# Patient Record
(demographics unavailable — no encounter records)

---

## 2025-02-28 NOTE — PHYSICAL EXAM
[Alert] : alert [Normocephalic] : normocephalic [Flat Open Anterior [place]] : flat open anterior fontanelle [PERRL] : PERRL [Red Reflex Bilateral] : red reflex bilateral [Normally Placed Ears] : normally placed ears [Auricles Well Formed] : auricles well formed [Clear Tympanic membranes] : clear tympanic membranes [Light reflex present] : light reflex present [Bony structures visible] : bony structures visible [Patent Auditory Canal] : patent auditory canal [Nares Patent] : nares patent [Palate Intact] : palate intact [Uvula Midline] : uvula midline [Supple, full passive range of motion] : supple, full passive range of motion [Symmetric Chest Rise] : symmetric chest rise [Clear to Auscultation Bilaterally] : clear to auscultation bilaterally [Regular Rate and Rhythm] : regular rate and rhythm [S1, S2 present] : S1, S2 present [+2 Femoral Pulses] : +2 femoral pulses [Soft] : soft [Bowel Sounds] : bowel sounds present [Umbilical Stump Dry, Clean, Intact] : umbilical stump dry, clean, intact [Normal external genitalia] : normal external genitalia [Patent Vagina] : patent vagina [Patent] : patent [Normally Placed] : normally placed [No Abnormal Lymph Nodes Palpated] : no abnormal lymph nodes palpated [Symmetric Flexed Extremities] : symmetric flexed extremities [Startle Reflex] : startle reflex present [Suck Reflex] : suck reflex present [Rooting] : rooting reflex present [Palmar Grasp] : palmar grasp present [Plantar Grasp] : plantar reflex present [Symmetric Maris] : symmetric [place] [Acute Distress] : no acute distress [Icteric sclera] : nonicteric sclera [Discharge] : no discharge [Palpable Masses] : no palpable masses [Murmurs] : no murmurs [Tender] : nontender [Distended] : not distended [Hepatomegaly] : no hepatomegaly [Splenomegaly] : no splenomegaly [Clitoromegaly] : no clitoromegaly [Cheng-Ortolani] : negative Cheng-Ortolani [Spinal Dimple] : no spinal dimple [Tuft of Hair] : no tuft of hair [Jaundice] : not jaundice [de-identified] : +polydactyly b/l

## 2025-02-28 NOTE — HISTORY OF PRESENT ILLNESS
[Breast milk] : breast milk [Formula ___ oz/feed] : [unfilled] oz of formula per feed [Normal] : Normal [In Bassinet/Crib] : sleeps in bassinet/crib [On back] : sleeps on back [No] : No cigarette smoke exposure [Hepatitis B Vaccine Given] : Hepatitis B vaccine given [FreeTextEntry8] : Date/Time of Birth 2025 14:02 Gestational Age at Birth (WEEKS) 38 Week(s) Admission Weight (GRAMS) 2840 Gm Admission Height (CENTIMETERS) 48 cm Discharge weight was 2593 g Weight Change Percentage: -8.7  Hospital Course Report as per L&D RN: 38.1wk female born via primary CS due to history of myomectomy on 25 at 1402 to a 37 y/o  blood type A+ mother. Maternal history of anemia. No significant prenatal history. PNL as follows: HIV -, Hep B/C - RPR NR, Rubella I, GBS - on 25. AROM at delivery fluid. Baby emerged vigorous, crying, was warmed, dried,suctioned and stimulated with APGARS of 9/9. Mom plans to initiate breastfeeding. Consents to Hep B vaccine. Highest maternal temp 37C.  Since admission to the  nursery, baby has been feeding, voiding, and stooling appropriately. Vitals remained stable during admission. Baby received routine  care. Baby was noted to be have b/l polydactyly and recommended outpatient follow up with plastic surgery. Due to increased weight loss, mom started supplementing and recommended close monitoring of wet diapers and stools.  Discharge Bilirubin 6.9 at 36 hours of life below phototherapy threshold [Hours between feeds ___] : Child is fed every [unfilled] hours [Co-sleeping] : no co-sleeping [Loose bedding, pillow, toys, and/or bumpers in crib] : no loose bedding, pillow, toys, and/or bumpers in crib [Exposure to electronic nicotine delivery system] : No exposure to electronic nicotine delivery system

## 2025-02-28 NOTE — DISCUSSION/SUMMARY
Patient just had a procedure yesterday and today she is coughing. Advised patient to follow the plan Dr. Szymanski gave her for post up procedures. Patient verbalized understanding and had no further questions.   [ Transition] :  transition [ Care] :  care [Nutritional Adequacy] : nutritional adequacy [Parental Well-Being] : parental well-being [Safety] : safety [FreeTextEntry1] : Carlotta is a 3-day-old ex-38wkr F here today for  visit. Baby has dropped 15% below birth weight. Likely due to inadequate intake as mom's supply not in and was told to limit formula intake to 20cc  NUTRITION -Discussed supplementing every feed and increasing amount as tolerated. No more than 3 hours in between feeds -Start D-vi-sol once daily  SURGERY -Follow up hand specialist for polydactyl  HEALTH MAINTENANCE -Received Hep B #1 at birth -Recommend parents to get flu/tdap vaccines T bili 6.1  ANTICIPATORY GUIDANCE - topics discussed: Nutrition, elimination, immunity, umbilical cord care, car safety  RTC tomorrow for weight check

## 2025-03-01 NOTE — HISTORY OF PRESENT ILLNESS
[de-identified] : FOLLOW UP ON WEIGHT [FreeTextEntry6] : BF and supplementing 2 oz formula every 3 hours. normal wet diapers and stools. no acute concerns

## 2025-03-01 NOTE — DISCUSSION/SUMMARY
[FreeTextEntry1] : Carlotta is a 4-day-old here today for weight check. Baby is feeding, voiding, stooling, and gaining weight well, still below birth weight. No acute concerns  NUTRITION -Continue with current feeds  ANTICIPATORY GUIDANCE -[place] topics discussed: Nutrition, elimination, immunity, car safety, tummy time, umbilical cord care  weight check at lactation visit next week

## 2025-03-26 NOTE — DISCUSSION/SUMMARY

## 2025-03-26 NOTE — DEVELOPMENTAL MILESTONES
[Passed] : passed [Calms when picked up or spoken to] : calms when picked up or spoken to [Looks briefly at objects] : looks briefly at objects [Alerts to unexpected sound] : alerts to unexpected sound [Makes brief short vowel sounds] : makes brief short vowel sounds

## 2025-03-26 NOTE — HISTORY OF PRESENT ILLNESS
[Mother] : mother [Breast milk] : breast milk [Formula ___ oz/feed] : [unfilled] oz of formula per feed [Formula ___ oz in 24hrs] : [unfilled] oz of formula in 24 hours [Normal] : Normal [In Bassinet/Crib] : sleeps in bassinet/crib [On back] : sleeps on back [Co-sleeping] : no co-sleeping [Loose bedding, pillow, toys, and/or bumpers in crib] : no loose bedding, pillow, toys, and/or bumpers in crib [No] : No cigarette smoke exposure [Water heater temperature set at <120 degrees F] : Water heater temperature set at <120 degrees F [Rear facing car seat in back seat] : Rear facing car seat in back seat [Carbon Monoxide Detectors] : Carbon monoxide detectors at home [Smoke Detectors] : Smoke detectors at home. [At risk for exposure to TB] : Not at risk for exposure to Tuberculosis  [NO] : No [FreeTextEntry1] : WELL VISIT ONE MONTH OLD

## 2025-03-26 NOTE — PHYSICAL EXAM
[Alert] : alert [Acute Distress] : no acute distress [Normocephalic] : normocephalic [Flat Open Anterior [place]] : flat open anterior fontanelle [PERRL] : PERRL [Red Reflex Bilateral] : red reflex bilateral [Normally Placed Ears] : normally placed ears [Auricles Well Formed] : auricles well formed [Clear Tympanic membranes] : clear tympanic membranes [Light reflex present] : light reflex present [Bony landmarks visible] : bony landmarks visible [Discharge] : no discharge [Nares Patent] : nares patent [Palate Intact] : palate intact [Uvula Midline] : uvula midline [Supple, full passive range of motion] : supple, full passive range of motion [Palpable Masses] : no palpable masses [Symmetric Chest Rise] : symmetric chest rise [Clear to Auscultation Bilaterally] : clear to auscultation bilaterally [Regular Rate and Rhythm] : regular rate and rhythm [S1, S2 present] : S1, S2 present [Murmurs] : no murmurs [+2 Femoral Pulses] : +2 femoral pulses [Soft] : soft [Tender] : nontender [Distended] : not distended [Bowel Sounds] : bowel sounds present [Hepatomegaly] : no hepatomegaly [Splenomegaly] : no splenomegaly [Normal external genitailia] : normal external genitalia [Clitoromegaly] : no clitoromegaly [Patent Vagina] : vagina patent [Normally Placed] : normally placed [No Abnormal Lymph Nodes Palpated] : no abnormal lymph nodes palpated [Cheng-Ortolani] : negative Cheng-Ortolani [Symmetric Flexed Extremities] : symmetric flexed extremities [Spinal Dimple] : no spinal dimple [Tuft of Hair] : no tuft of hair [Startle Reflex] : startle reflex present [Suck Reflex] : suck reflex present [Rooting] : rooting reflex present [Palmar Grasp] : palmar grasp reflex present [Plantar Grasp] : plantar grasp reflex present [Symmetric Maris] : symmetric [place] [Jaundice] : no jaundice [Rash and/or lesion present] : no rash/lesion [de-identified] : polydactly noted

## 2025-04-28 NOTE — PHYSICAL EXAM
[Alert] : alert [Acute Distress] : no acute distress [Normocephalic] : normocephalic [Flat Open Anterior [place]] : flat open anterior fontanelle [PERRL] : PERRL [Red Reflex Bilateral] : red reflex bilateral [Normally Placed Ears] : normally placed ears [Auricles Well Formed] : auricles well formed [Clear Tympanic membranes] : clear tympanic membranes [Light reflex present] : light reflex present [Bony landmarks visible] : bony landmarks visible [Discharge] : no discharge [Nares Patent] : nares patent [Palate Intact] : palate intact [Uvula Midline] : uvula midline [Supple, full passive range of motion] : supple, full passive range of motion [Palpable Masses] : no palpable masses [Symmetric Chest Rise] : symmetric chest rise [Clear to Auscultation Bilaterally] : clear to auscultation bilaterally [Regular Rate and Rhythm] : regular rate and rhythm [S1, S2 present] : S1, S2 present [Murmurs] : no murmurs [+2 Femoral Pulses] : +2 femoral pulses [Soft] : soft [Tender] : nontender [Distended] : not distended [Bowel Sounds] : bowel sounds present [Hepatomegaly] : no hepatomegaly [Splenomegaly] : no splenomegaly [Normal external genitailia] : normal external genitalia [Clitoromegaly] : no clitoromegaly [Patent Vagina] : vagina patent [Normally Placed] : normally placed [No Abnormal Lymph Nodes Palpated] : no abnormal lymph nodes palpated [Cheng-Ortolani] : negative Cheng-Ortolani [Symmetric Flexed Extremities] : symmetric flexed extremities [Spinal Dimple] : no spinal dimple [Tuft of Hair] : no tuft of hair [Startle Reflex] : startle reflex present [Suck Reflex] : suck reflex present [Rooting] : rooting reflex present [Palmar Grasp] : palmar grasp reflex present [Plantar Grasp] : plantar grasp reflex present [Symmetric Maris] : symmetric [place] [Rash and/or lesion present] : no rash/lesion

## 2025-04-28 NOTE — DISCUSSION/SUMMARY
[] : The components of the vaccine(s) to be administered today are listed in the plan of care. The disease(s) for which the vaccine(s) are intended to prevent and the risks have been discussed with the caretaker.  The risks are also included in the appropriate vaccination information statements which have been provided to the patient's caregiver.  The caregiver has given consent to vaccinate. [FreeTextEntry1] : Well 2 month old Growth and development: normal Discussed infant feeding and provided information Reviewed safety/anticipatory guidance Discussed need for vaccines, reviewed side effects and VIS Next PE: age 4 mos  Recommend exclusive breastfeeding, 8-12 feedings per day. Mother should continue prenatal vitamins and avoid alcohol. If formula is needed, recommend iron-fortified formulations, 2-4 oz every 3-4 hrs. When in car, patient should be in rear-facing car seat in back seat. Put baby to sleep on back, in own crib with no loose or soft bedding. Help baby to maintain sleep and feeding routines. May offer pacifier if needed. Continue tummy time when awake. Parents counseled to call if rectal temperature >100.4 degrees F.

## 2025-04-28 NOTE — HISTORY OF PRESENT ILLNESS
[Mother] : mother [Breast milk] : breast milk [Formula ___ oz/feed] : [unfilled] oz of formula per feed [Normal] : Normal [In Bassinet/Crib] : sleeps in bassinet/crib [On back] : sleeps on back [Co-sleeping] : no co-sleeping [Loose bedding, pillow, toys, and/or bumpers in crib] : no loose bedding, pillow, toys, and/or bumpers in crib [No] : No cigarette smoke exposure [Water heater temperature set at <120 degrees F] : Water heater temperature set at <120 degrees F [Rear facing car seat in back seat] : Rear facing car seat in back seat [Carbon Monoxide Detectors] : Carbon monoxide detectors at home [Smoke Detectors] : Smoke detectors at home. [At risk for exposure to TB] : Not at risk for exposure to Tuberculosis  [NO] : No [FreeTextEntry1] : 2 MONTH WELL VISIT

## 2025-04-28 NOTE — DEVELOPMENTAL MILESTONES
[Smiles responsively] : smiles responsively [Vocalizes with simple cooing] : vocalizes with simple cooing [Lifts head and chest in prone] : lifts head and chest in prone [Opens and shuts hands] : opens and shuts hands [Passed] : passed [Yes] : Completed.

## 2025-05-19 NOTE — HISTORY OF PRESENT ILLNESS
[de-identified] : SPITTING UP ON AND OFF AFTER FEDDING X 2 WEEKS--ER AT [facility] 5-2-25 FOR CONGESTIONG-LINGS WERE CLEAR AS PER MOM [FreeTextEntry6] : difficulty burping spitting up occassional

## 2025-05-19 NOTE — DISCUSSION/SUMMARY
[FreeTextEntry1] : good weight gain Reviewed ways to decrease ejection of milk Burp well Keep elevated after feeds   RTO  2 months

## 2025-06-27 NOTE — DISCUSSION/SUMMARY
[Family Functioning] : family functioning [Nutritional Adequacy and Growth] : nutritional adequacy and growth [Infant Development] : infant development [Oral Health] : oral health [Safety] : safety [FreeTextEntry1] : Carlotta is a 4-month-old girl here today for well visit. No acute concerns for growth or development.   NUTRITION -Discussed starting solids -No water till 6 months of age, no juice or honey till 12 months of age  HEALTH MAINTENANCE -Vaccines today: Vaxelis, Prevnar, Rotavirus #2. VIS given -EDPS Score 0  ANTICIPATORY GUIDANCE -Tummy time, car safety, summer safety, childproofing house, not placing child on high surfaces unattended discussed  RTC for 6 month old visit, or earlier PRN

## 2025-06-27 NOTE — HISTORY OF PRESENT ILLNESS
[Breast milk] : breast milk [Expressed Breast milk ___oz/feed] : [unfilled] oz of expressed breast milk per feed [Vitamins ___] : Patient takes [unfilled] vitamins daily [Normal] : Normal [In Bassinet/Crib] : sleeps in bassinet/crib [On back] : sleeps on back [Sleeps 12-16 hours per 24 hours (including naps)] : sleeps 12-16 hours per 24 hours (including naps) [Tummy time] : tummy time [No] : No cigarette smoke exposure [Co-sleeping] : no co-sleeping [Loose bedding, pillow, toys, and/or bumpers in crib] : no loose bedding, pillow, toys, and/or bumpers in crib [Exposure to electronic nicotine delivery system] : No exposure to electronic nicotine delivery system [FreeTextEntry7] : Spit up much improved holding baby upright longer after each feed

## 2025-06-27 NOTE — PHYSICAL EXAM
[Alert] : alert [Acute Distress] : no acute distress [Normocephalic] : normocephalic [Flat Open Anterior [place]] : flat open anterior fontanelle [Red Reflex] : red reflex bilateral [PERRL] : PERRL [Normally Placed Ears] : normally placed ears [Auricles Well Formed] : auricles well formed [Clear Tympanic membranes] : clear tympanic membranes [Light reflex present] : light reflex present [Bony landmarks visible] : bony landmarks visible [Discharge] : no discharge [Nares Patent] : nares patent [Palate Intact] : palate intact [Uvula Midline] : uvula midline [Palpable Masses] : no palpable masses [Symmetric Chest Rise] : symmetric chest rise [Clear to Auscultation Bilaterally] : clear to auscultation bilaterally [Regular Rate and Rhythm] : regular rate and rhythm [S1, S2 present] : S1, S2 present [Murmurs] : no murmurs [+2 Femoral Pulses] : (+) 2 femoral pulses [Soft] : soft [Tender] : nontender [Distended] : nondistended [Bowel Sounds] : bowel sounds present [Hepatomegaly] : no hepatomegaly [Splenomegaly] : no splenomegaly [Normal External Genitalia] : normal external genitalia [Clitoromegaly] : no clitoromegaly [Normal Vaginal Introitus] : normal vaginal introitus [Patent] : patent [Normally Placed] : normally placed [No Abnormal Lymph Nodes Palpated] : no abnormal lymph nodes palpated [Cheng-Ortolani] : negative Cheng-Ortolani [Allis Sign] : negative Allis sign [Spinal Dimple] : no spinal dimple [Tuft of Hair] : no tuft of hair [Startle Reflex] : startle reflex present [Plantar Grasp] : plantar grasp reflex present [Symmetric Maris] : symmetric maris [Rash or Lesions] : no rash/lesions

## 2025-07-24 NOTE — HISTORY OF PRESENT ILLNESS
[de-identified] : spitting up mucus after eating  [FreeTextEntry6] : BF and formula drooling/teething